# Patient Record
Sex: MALE | Race: WHITE | HISPANIC OR LATINO | URBAN - METROPOLITAN AREA
[De-identification: names, ages, dates, MRNs, and addresses within clinical notes are randomized per-mention and may not be internally consistent; named-entity substitution may affect disease eponyms.]

---

## 2020-07-23 ENCOUNTER — OFFICE VISIT (OUTPATIENT)
Dept: URBAN - METROPOLITAN AREA SURGERY CENTER 4 | Facility: SURGERY CENTER | Age: 55
End: 2020-07-23

## 2020-07-27 LAB — PATHOLOGY (INDENTED REPORT): (no result)

## 2020-08-10 ENCOUNTER — OFFICE VISIT (OUTPATIENT)
Dept: URBAN - METROPOLITAN AREA CLINIC 63 | Facility: CLINIC | Age: 55
End: 2020-08-10

## 2022-07-09 ENCOUNTER — TELEPHONE ENCOUNTER (OUTPATIENT)
Dept: URBAN - METROPOLITAN AREA CLINIC 121 | Facility: CLINIC | Age: 57
End: 2022-07-09

## 2022-07-09 RX ORDER — BUPROPION HYDROCHLORIDE 300 MG/1
TABLET, EXTENDED RELEASE ORAL
Refills: 0 | OUTPATIENT
Start: 2019-03-18 | End: 2019-04-25

## 2022-07-09 RX ORDER — OXYCODONE AND ACETAMINOPHEN 5; 325 MG/1; MG/1
PRN TABLET ORAL
Refills: 0 | OUTPATIENT
Start: 2019-03-25 | End: 2019-04-25

## 2022-07-09 RX ORDER — LOSARTAN POTASSIUM 25 MG/1
TABLET, FILM COATED ORAL
Refills: 0 | OUTPATIENT
Start: 2018-12-26 | End: 2019-04-25

## 2022-07-09 RX ORDER — TEMAZEPAM 30 MG/1
CAPSULE ORAL
Refills: 0 | OUTPATIENT
Start: 2019-03-25 | End: 2019-04-25

## 2022-07-09 RX ORDER — CLONAZEPAM 2 MG/1
TABLET ORAL
Refills: 0 | OUTPATIENT
Start: 2019-03-20 | End: 2019-04-25

## 2022-07-09 RX ORDER — OXYCODONE AND ACETAMINOPHEN 5; 325 MG/1; MG/1
PRN TABLET ORAL
Refills: 0 | OUTPATIENT
Start: 2019-04-25 | End: 2020-08-10

## 2022-07-09 RX ORDER — GEMFIBROZIL 600 MG/1
TABLET, FILM COATED ORAL
Refills: 0 | OUTPATIENT
Start: 2019-03-25 | End: 2019-04-25

## 2022-07-10 ENCOUNTER — TELEPHONE ENCOUNTER (OUTPATIENT)
Dept: URBAN - METROPOLITAN AREA CLINIC 121 | Facility: CLINIC | Age: 57
End: 2022-07-10

## 2022-07-10 RX ORDER — BUPROPION HYDROCHLORIDE 300 MG/1
TABLET, EXTENDED RELEASE ORAL
Refills: 0 | Status: ACTIVE | COMMUNITY
Start: 2019-04-25

## 2022-07-10 RX ORDER — CLONAZEPAM 2 MG/1
TABLET ORAL
Refills: 0 | Status: ACTIVE | COMMUNITY
Start: 2019-04-25

## 2022-07-10 RX ORDER — GEMFIBROZIL 600 MG/1
TABLET, FILM COATED ORAL
Refills: 0 | Status: ACTIVE | COMMUNITY
Start: 2019-04-25

## 2022-07-10 RX ORDER — TEMAZEPAM 30 MG/1
CAPSULE ORAL
Refills: 0 | Status: ACTIVE | COMMUNITY
Start: 2019-04-25

## 2022-10-03 ENCOUNTER — OFFICE VISIT (OUTPATIENT)
Dept: URBAN - METROPOLITAN AREA CLINIC 63 | Facility: CLINIC | Age: 57
End: 2022-10-03

## 2022-11-21 ENCOUNTER — WEB ENCOUNTER (OUTPATIENT)
Dept: URBAN - METROPOLITAN AREA CLINIC 63 | Facility: CLINIC | Age: 57
End: 2022-11-21

## 2022-11-21 ENCOUNTER — OFFICE VISIT (OUTPATIENT)
Dept: URBAN - METROPOLITAN AREA CLINIC 63 | Facility: CLINIC | Age: 57
End: 2022-11-21
Payer: MEDICARE

## 2022-11-21 VITALS
BODY MASS INDEX: 30.01 KG/M2 | OXYGEN SATURATION: 94 % | WEIGHT: 198 LBS | TEMPERATURE: 98 F | DIASTOLIC BLOOD PRESSURE: 80 MMHG | HEART RATE: 95 BPM | HEIGHT: 68 IN | SYSTOLIC BLOOD PRESSURE: 130 MMHG

## 2022-11-21 DIAGNOSIS — Z86.010 PERSONAL HISTORY OF COLONIC POLYPS: ICD-10-CM

## 2022-11-21 PROCEDURE — 99203 OFFICE O/P NEW LOW 30 MIN: CPT | Performed by: NURSE PRACTITIONER

## 2022-11-21 RX ORDER — GEMFIBROZIL 600 MG/1
TABLET, FILM COATED ORAL
Refills: 0 | Status: ACTIVE | COMMUNITY
Start: 2019-04-25

## 2022-11-21 RX ORDER — ZOLPIDEM TARTRATE 10 MG/1
1 TABLET AT BEDTIME AS NEEDED TABLET, FILM COATED ORAL ONCE A DAY
Status: ACTIVE | COMMUNITY

## 2022-11-21 RX ORDER — BUPROPION HYDROCHLORIDE 300 MG/1
TABLET, EXTENDED RELEASE ORAL
Refills: 0 | Status: ACTIVE | COMMUNITY
Start: 2019-04-25

## 2022-11-21 NOTE — HPI-HPI
3/19 Patient here referred by his PCP due to a positive occult blood in the stool, patient denies any rectal bleeding. His last colonoscopy done 6 year ago in [place] as per his report, diverticular disease and some polyps that were removed. No CRC Hx. 4/19 Patient colonoscopy with evidence of internal hemorrhoids, diverticular disease, 4 large tubular adenoma polyps. Today in good general state, he has not GI complaint. 4/20:  Patient well-known by me, last visit was in April 2019 had a colonoscopy on that month with evidence of a 12 mm ileocecal valve polyp that was removed piecemeal with hot snare, a millimeter polyp descending colon removed with hot snare and there were 2 polyps from 7 to 12 mm in the ascending colon that were removed piecemeal with hot snare.  Polyps were tubular adenomas.  Patient will need to have a surveillance colonoscopy, because of coronavirus pandemia will wait a few months for surveillance colonoscopy, patient is agreeable to that.  Last colonoscopy also showed diverticulosis and internal hemorrhoids.  Patient denies rectal bleeding or change in his bowel habits does states that in occasion he had darker stools but no melena.  Patient had surgery in July 2020 of his left shoulder related to rotator cuff, now doing well denies pain.  We will schedule colonoscopy in few months from now, patient will call back if there is any alarm symptoms or any GI complaints.  8/20 Patient colonoscopy with evidence of an 8 mm tubular adenoma polyp into the hepatic flexure, a 5 mm tubular adenoma polyp into the sigmoid colon, a 5 mm hyperplastic polyp into the rectum, mild diverticular disease of the entire colon, and internal hemorrhoids, biopsy results shows evidence of sigmoid colon previous polypectomy site biopsy with colonic mucosa with hyperplastic changes, negative for dysplasia.  Next colonoscopy in 2 years. 11/22 Patient is here today for his  surveillance colonoscopy.  He has medical history of adenoma polyp of the colon. Denies any rectal bleeding, mucus discharge, or change in his bowel habits.

## 2022-11-21 NOTE — PHYSICAL EXAM CONSTITUTIONAL:
well developed, well nourished , in no acute distress , ambulating without difficulty , normal communication ability gradual onset

## 2022-11-28 ENCOUNTER — LAB OUTSIDE AN ENCOUNTER (OUTPATIENT)
Dept: URBAN - METROPOLITAN AREA CLINIC 63 | Facility: CLINIC | Age: 57
End: 2022-11-28

## 2022-12-30 ENCOUNTER — WEB ENCOUNTER (OUTPATIENT)
Dept: URBAN - METROPOLITAN AREA SURGERY CENTER 4 | Facility: SURGERY CENTER | Age: 57
End: 2022-12-30

## 2023-01-05 ENCOUNTER — CLAIMS CREATED FROM THE CLAIM WINDOW (OUTPATIENT)
Dept: URBAN - METROPOLITAN AREA SURGERY CENTER 4 | Facility: SURGERY CENTER | Age: 58
End: 2023-01-05
Payer: MEDICARE

## 2023-01-05 ENCOUNTER — LAB OUTSIDE AN ENCOUNTER (OUTPATIENT)
Dept: URBAN - METROPOLITAN AREA SURGERY CENTER 4 | Facility: SURGERY CENTER | Age: 58
End: 2023-01-05

## 2023-01-05 DIAGNOSIS — K64.1 BLEEDING GRADE II HEMORRHOIDS: ICD-10-CM

## 2023-01-05 DIAGNOSIS — K63.5 BENIGN COLON POLYP: ICD-10-CM

## 2023-01-05 DIAGNOSIS — K56.699 COLON STRICTURE: ICD-10-CM

## 2023-01-05 DIAGNOSIS — Z86.010 PERSONAL HISTORY OF COLONIC POLYPS: ICD-10-CM

## 2023-01-05 DIAGNOSIS — K57.30 ACQUIRED DIVERTICULOSIS OF COLON: ICD-10-CM

## 2023-01-05 PROCEDURE — 45380 COLONOSCOPY AND BIOPSY: CPT | Performed by: INTERNAL MEDICINE

## 2023-01-05 RX ORDER — BUPROPION HYDROCHLORIDE 300 MG/1
TABLET, EXTENDED RELEASE ORAL
Refills: 0 | Status: ACTIVE | COMMUNITY
Start: 2019-04-25

## 2023-01-05 RX ORDER — GEMFIBROZIL 600 MG/1
TABLET, FILM COATED ORAL
Refills: 0 | Status: ACTIVE | COMMUNITY
Start: 2019-04-25

## 2023-01-05 RX ORDER — ZOLPIDEM TARTRATE 10 MG/1
1 TABLET AT BEDTIME AS NEEDED TABLET, FILM COATED ORAL ONCE A DAY
Status: ACTIVE | COMMUNITY

## 2023-01-30 ENCOUNTER — TELEPHONE ENCOUNTER (OUTPATIENT)
Dept: URBAN - METROPOLITAN AREA CLINIC 63 | Facility: CLINIC | Age: 58
End: 2023-01-30

## 2023-02-14 ENCOUNTER — OFFICE VISIT (OUTPATIENT)
Dept: URBAN - METROPOLITAN AREA CLINIC 63 | Facility: CLINIC | Age: 58
End: 2023-02-14

## 2023-02-22 ENCOUNTER — WEB ENCOUNTER (OUTPATIENT)
Dept: URBAN - METROPOLITAN AREA CLINIC 63 | Facility: CLINIC | Age: 58
End: 2023-02-22

## 2023-02-22 ENCOUNTER — OFFICE VISIT (OUTPATIENT)
Dept: URBAN - METROPOLITAN AREA CLINIC 63 | Facility: CLINIC | Age: 58
End: 2023-02-22

## 2023-02-22 ENCOUNTER — OFFICE VISIT (OUTPATIENT)
Dept: URBAN - METROPOLITAN AREA CLINIC 63 | Facility: CLINIC | Age: 58
End: 2023-02-22
Payer: MEDICARE

## 2023-02-22 VITALS
TEMPERATURE: 97.9 F | WEIGHT: 179.6 LBS | HEIGHT: 68 IN | SYSTOLIC BLOOD PRESSURE: 120 MMHG | BODY MASS INDEX: 27.22 KG/M2 | HEART RATE: 92 BPM | OXYGEN SATURATION: 98 % | DIASTOLIC BLOOD PRESSURE: 80 MMHG

## 2023-02-22 DIAGNOSIS — Z86.010 PERSONAL HISTORY OF COLONIC POLYPS: ICD-10-CM

## 2023-02-22 DIAGNOSIS — R93.89 ABNORMAL CT SCAN: ICD-10-CM

## 2023-02-22 PROBLEM — 129679001: Status: ACTIVE | Noted: 2023-01-30

## 2023-02-22 PROCEDURE — 99213 OFFICE O/P EST LOW 20 MIN: CPT | Performed by: INTERNAL MEDICINE

## 2023-02-22 RX ORDER — BUPROPION HYDROCHLORIDE 300 MG/1
TABLET, EXTENDED RELEASE ORAL
Refills: 0 | Status: ACTIVE | COMMUNITY
Start: 2019-04-25

## 2023-02-22 RX ORDER — ZOLPIDEM TARTRATE 10 MG/1
1 TABLET AT BEDTIME AS NEEDED TABLET, FILM COATED ORAL ONCE A DAY
Status: ACTIVE | COMMUNITY

## 2023-02-22 RX ORDER — GEMFIBROZIL 600 MG/1
TABLET, FILM COATED ORAL
Refills: 0 | Status: ACTIVE | COMMUNITY
Start: 2019-04-25

## 2023-02-22 NOTE — HPI-TODAY'S VISIT:
2/23: Patient with personal history of colonic polyps and had a surveillance colonoscopy was incomplete with evidence of hyperplastic polyp in the sigmoid colon diverticulosis, the narrowing was not able to pass so virtual colonoscopy was performed CT repeat colonoscopy shows significant asymmetry bowel wall thickening in the mid sigmoid colon.  Thickening is a small just 1.7 cm and is concerning of neoplastic disease.  There is a slight prominence of the venous vascularity in the imminent periscrotal soft tissue.  There is a capillary diverticular disease throughout most of the colon without other areas of bowel wall thickening or polypoid lesion.  The appendix is opacified.  With this finding and with an incomplete colonoscopy will defer management to surgical team, patient most likely will need to have surgery, will do referral to colorectal, Dr. Osborne. Will follow in 3 months.

## 2023-03-08 ENCOUNTER — OFFICE VISIT (OUTPATIENT)
Dept: URBAN - METROPOLITAN AREA CLINIC 63 | Facility: CLINIC | Age: 58
End: 2023-03-08

## 2023-05-24 ENCOUNTER — OFFICE VISIT (OUTPATIENT)
Dept: URBAN - METROPOLITAN AREA CLINIC 63 | Facility: CLINIC | Age: 58
End: 2023-05-24
Payer: MEDICARE

## 2023-05-24 VITALS
DIASTOLIC BLOOD PRESSURE: 90 MMHG | BODY MASS INDEX: 27.04 KG/M2 | HEIGHT: 68 IN | TEMPERATURE: 97.7 F | OXYGEN SATURATION: 99 % | WEIGHT: 178.4 LBS | HEART RATE: 82 BPM | SYSTOLIC BLOOD PRESSURE: 130 MMHG

## 2023-05-24 DIAGNOSIS — Z86.010 PERSONAL HISTORY OF COLONIC POLYPS: ICD-10-CM

## 2023-05-24 DIAGNOSIS — K64.8 OTHER HEMORRHOIDS: ICD-10-CM

## 2023-05-24 DIAGNOSIS — K57.90 DIVERTICULOSIS: ICD-10-CM

## 2023-05-24 PROBLEM — 397881000: Status: ACTIVE | Noted: 2023-05-24

## 2023-05-24 PROCEDURE — 99213 OFFICE O/P EST LOW 20 MIN: CPT | Performed by: INTERNAL MEDICINE

## 2023-05-24 RX ORDER — BUPROPION HYDROCHLORIDE 300 MG/1
TABLET, EXTENDED RELEASE ORAL
Refills: 0 | Status: ACTIVE | COMMUNITY
Start: 2019-04-25

## 2023-05-24 RX ORDER — ZOLPIDEM TARTRATE 10 MG/1
1 TABLET AT BEDTIME AS NEEDED TABLET, FILM COATED ORAL ONCE A DAY
Status: ACTIVE | COMMUNITY

## 2023-05-24 RX ORDER — GEMFIBROZIL 600 MG/1
TABLET, FILM COATED ORAL
Refills: 0 | Status: ACTIVE | COMMUNITY
Start: 2019-04-25

## 2023-05-24 NOTE — HPI-TODAY'S VISIT:
2/23: Patient with personal history of colonic polyps and had a surveillance colonoscopy was incomplete with evidence of hyperplastic polyp in the sigmoid colon diverticulosis, the narrowing was not able to pass so virtual colonoscopy was performed CT repeat colonoscopy shows significant asymmetry bowel wall thickening in the mid sigmoid colon.  Thickening is a small just 1.7 cm and is concerning of neoplastic disease.  There is a slight prominence of the venous vascularity in the imminent periscrotal soft tissue.  There is a capillary diverticular disease throughout most of the colon without other areas of bowel wall thickening or polypoid lesion.  The appendix is opacified.  With this finding and with an incomplete colonoscopy will defer management to surgical team, patient most likely will need to have surgery, will do referral to colorectal, Dr. Osborne. Will follow in 3 months. 5/23: Patient was evaluated by Dr. Pacheco harp for starting and had robotic low anterior resection with SPY fluorescence infrared image with intraoperative interpretation.  Patient was febrile rectal catheter and dilation of the rectal stricture by Dr. Hinojosa in April 6, 2023.  Patient is doing well, after surgery it is recommended to complete a colonoscopy we will wait time of recovery and will plan to schedule on this visit in a few months from now.  Patient agreed with the plan. Pathology showed diverticulitis stricture no malignancy was reported.  Will plan colonoscopy in 3 months.

## 2023-05-24 NOTE — PHYSICAL EXAM GASTROINTESTINAL
Abdomen , soft, nontender, nondistended , no guarding or rigidity , no masses palpable , normal bowel sounds , Liver and Spleen,  no hepatosplenomegaly , liver nontender scars from recent surgery  healing well

## 2023-05-31 ENCOUNTER — LAB OUTSIDE AN ENCOUNTER (OUTPATIENT)
Dept: URBAN - METROPOLITAN AREA CLINIC 63 | Facility: CLINIC | Age: 58
End: 2023-05-31

## 2023-08-24 ENCOUNTER — OFFICE VISIT (OUTPATIENT)
Dept: URBAN - METROPOLITAN AREA SURGERY CENTER 4 | Facility: SURGERY CENTER | Age: 58
End: 2023-08-24

## 2023-08-24 RX ORDER — ZOLPIDEM TARTRATE 10 MG/1
1 TABLET AT BEDTIME AS NEEDED TABLET, FILM COATED ORAL ONCE A DAY
Status: ACTIVE | COMMUNITY

## 2023-08-24 RX ORDER — BUPROPION HYDROCHLORIDE 300 MG/1
TABLET, EXTENDED RELEASE ORAL
Refills: 0 | Status: ACTIVE | COMMUNITY
Start: 2019-04-25

## 2023-08-24 RX ORDER — GEMFIBROZIL 600 MG/1
TABLET, FILM COATED ORAL
Refills: 0 | Status: ACTIVE | COMMUNITY
Start: 2019-04-25

## 2023-08-25 ENCOUNTER — OFFICE VISIT (OUTPATIENT)
Dept: URBAN - METROPOLITAN AREA SURGERY CENTER 4 | Facility: SURGERY CENTER | Age: 58
End: 2023-08-25

## 2023-09-13 ENCOUNTER — OFFICE VISIT (OUTPATIENT)
Dept: URBAN - METROPOLITAN AREA CLINIC 63 | Facility: CLINIC | Age: 58
End: 2023-09-13

## 2023-10-12 ENCOUNTER — OFFICE VISIT (OUTPATIENT)
Dept: URBAN - METROPOLITAN AREA SURGERY CENTER 4 | Facility: SURGERY CENTER | Age: 58
End: 2023-10-12
Payer: MEDICARE

## 2023-10-12 DIAGNOSIS — K64.1 INTERNAL HEMORRHOIDS GRADE II: ICD-10-CM

## 2023-10-12 DIAGNOSIS — Z12.11 COLON CANCER SCREENING (HIGH RISK): ICD-10-CM

## 2023-10-12 DIAGNOSIS — K62.1 RECTAL POLYP: ICD-10-CM

## 2023-10-12 DIAGNOSIS — K57.30 DIVERTICULA, COLON: ICD-10-CM

## 2023-10-12 DIAGNOSIS — K64.1 SECOND DEGREE HEMORRHOIDS: ICD-10-CM

## 2023-10-12 DIAGNOSIS — K63.5 BENIGN COLON POLYPS: ICD-10-CM

## 2023-10-12 DIAGNOSIS — Z12.11 ENCOUNTER FOR SCREENING FOR MALIGNANT NEOPLASM OF COLON: ICD-10-CM

## 2023-10-12 DIAGNOSIS — K57.30 DIVERTICULOSIS OF LARGE INTESTINE WITHOUT PERFORATION OR ABSCESS WITHOUT BLEEDING: ICD-10-CM

## 2023-10-12 PROCEDURE — 00811 ANES LWR INTST NDSC NOS: CPT | Performed by: NURSE ANESTHETIST, CERTIFIED REGISTERED

## 2023-10-12 PROCEDURE — 45385 COLONOSCOPY W/LESION REMOVAL: CPT | Performed by: INTERNAL MEDICINE

## 2023-10-12 RX ORDER — BUPROPION HYDROCHLORIDE 300 MG/1
TABLET, EXTENDED RELEASE ORAL
Refills: 0 | Status: ACTIVE | COMMUNITY
Start: 2019-04-25

## 2023-10-12 RX ORDER — GEMFIBROZIL 600 MG/1
TABLET, FILM COATED ORAL
Refills: 0 | Status: ACTIVE | COMMUNITY
Start: 2019-04-25

## 2023-10-12 RX ORDER — ZOLPIDEM TARTRATE 10 MG/1
1 TABLET AT BEDTIME AS NEEDED TABLET, FILM COATED ORAL ONCE A DAY
Status: ACTIVE | COMMUNITY

## 2023-10-24 ENCOUNTER — OFFICE VISIT (OUTPATIENT)
Dept: URBAN - METROPOLITAN AREA CLINIC 60 | Facility: CLINIC | Age: 58
End: 2023-10-24
Payer: MEDICARE

## 2023-10-24 ENCOUNTER — DASHBOARD ENCOUNTERS (OUTPATIENT)
Age: 58
End: 2023-10-24

## 2023-10-24 VITALS
HEIGHT: 68 IN | HEART RATE: 83 BPM | DIASTOLIC BLOOD PRESSURE: 78 MMHG | RESPIRATION RATE: 20 BRPM | SYSTOLIC BLOOD PRESSURE: 118 MMHG | OXYGEN SATURATION: 96 % | WEIGHT: 183 LBS | BODY MASS INDEX: 27.74 KG/M2 | TEMPERATURE: 98 F

## 2023-10-24 DIAGNOSIS — K63.5 HYPERPLASTIC COLONIC POLYP, UNSPECIFIED PART OF COLON: ICD-10-CM

## 2023-10-24 DIAGNOSIS — K64.8 OTHER HEMORRHOIDS: ICD-10-CM

## 2023-10-24 DIAGNOSIS — Z86.010 PERSONAL HISTORY OF COLONIC POLYPS: ICD-10-CM

## 2023-10-24 DIAGNOSIS — K57.90 DIVERTICULOSIS: ICD-10-CM

## 2023-10-24 PROCEDURE — 99213 OFFICE O/P EST LOW 20 MIN: CPT | Performed by: NURSE PRACTITIONER

## 2023-10-24 RX ORDER — GEMFIBROZIL 600 MG/1
TABLET, FILM COATED ORAL
Refills: 0 | Status: ACTIVE | COMMUNITY
Start: 2019-04-25

## 2023-10-24 RX ORDER — BUPROPION HYDROCHLORIDE 300 MG/1
TABLET, EXTENDED RELEASE ORAL
Refills: 0 | Status: ACTIVE | COMMUNITY
Start: 2019-04-25

## 2023-10-24 RX ORDER — ZOLPIDEM TARTRATE 10 MG/1
1 TABLET AT BEDTIME AS NEEDED TABLET, FILM COATED ORAL ONCE A DAY
Status: ACTIVE | COMMUNITY

## 2023-10-24 NOTE — HPI-TODAY'S VISIT:
2/23: Patient with personal history of colonic polyps and had a surveillance colonoscopy was incomplete with evidence of hyperplastic polyp in the sigmoid colon diverticulosis, the narrowing was not able to pass so virtual colonoscopy was performed CT repeat colonoscopy shows significant asymmetry bowel wall thickening in the mid sigmoid colon.  Thickening is a small just 1.7 cm and is concerning of neoplastic disease.  There is a slight prominence of the venous vascularity in the imminent marychuy scrotal soft tissue.  There is a capillary diverticular disease throughout most of the colon without other areas of bowel wall thickening or polypoid lesion.  The appendix is opacified.  With this finding and with an incomplete colonoscopy will defer management to surgical team, patient most likely will need to have surgery, will do referral to colorectal, Dr. Osborne. Will follow in 3 months. 5/23: Patient was evaluated by Dr. Pacheco harp for starting and had robotic low anterior resection with SPY fluorescence infrared image with intraoperative interpretation.  Patient was febrile rectal catheter and dilation of the rectal stricture by Dr. Hinojosa on April 6, 2023.  Patient is doing well, after surgery it is recommended to complete a colonoscopy we will wait time of recovery and will plan to schedule on this visit in a few months from now.  Patient agreed with the plan. Pathology showed diverticulitis stricture no malignancy was reported.  Will plan colonoscopy in 3 months. 10/23 Patient colonoscopy shows evidence of two 8 mm hyperplastic polyp into the rectum, mild diverticular disease of the sigmoid colon, and internal hemorrhoids.  The colon anastomosis is normal.  Today patient is in good general state.  He has no GI complaints next colonoscopy in 3 years.
